# Patient Record
Sex: FEMALE | Race: WHITE | NOT HISPANIC OR LATINO | URBAN - METROPOLITAN AREA
[De-identification: names, ages, dates, MRNs, and addresses within clinical notes are randomized per-mention and may not be internally consistent; named-entity substitution may affect disease eponyms.]

---

## 2024-06-18 ENCOUNTER — TELEPHONE (OUTPATIENT)
Dept: GENETICS | Facility: CLINIC | Age: 29
End: 2024-06-18
Payer: COMMERCIAL

## 2024-06-18 DIAGNOSIS — Z84.81 FAMILY HISTORY OF BRCA GENE MUTATION: ICD-10-CM

## 2024-06-18 DIAGNOSIS — Z80.42 FAMILY HISTORY OF PROSTATE CANCER: ICD-10-CM

## 2024-06-18 DIAGNOSIS — Z80.3 FAMILY HISTORY OF BREAST CANCER: ICD-10-CM

## 2024-06-18 DIAGNOSIS — Z80.41 FAMILY HISTORY OF OVARIAN CANCER: ICD-10-CM

## 2024-06-18 DIAGNOSIS — Z80.49 FAMILY HISTORY OF UTERINE CANCER: ICD-10-CM

## 2024-07-11 ENCOUNTER — TELEPHONE (OUTPATIENT)
Dept: GENETICS | Facility: CLINIC | Age: 29
End: 2024-07-11
Payer: COMMERCIAL

## 2024-07-11 NOTE — TELEPHONE ENCOUNTER
I attempted to leave a message for the patient regarding their outstanding genetic testing sample ordered by Heidi Whitmore GC. I was unable to leave a message as the patients' voicemail box was full.

## 2024-07-11 NOTE — TELEPHONE ENCOUNTER
I received a voicemail from the patient and returned their call. The patient was recently out of town and received their genetic testing kit. They intend to have their blood drawn over the next few days. They are also ready to schedule their preconception genetics visit and would be avaliable tomorrow to discuss scheduling.

## 2024-07-22 ENCOUNTER — TELEPHONE (OUTPATIENT)
Dept: OBSTETRICS AND GYNECOLOGY | Facility: CLINIC | Age: 29
End: 2024-07-22
Payer: COMMERCIAL

## 2024-07-22 NOTE — TELEPHONE ENCOUNTER
Patient called and LVM on Friday. I attempted to call her back to schedule (NEW patient) and her Voicemail box is full and cannot accept anymore messages.

## 2024-07-23 NOTE — TELEPHONE ENCOUNTER
I spoke with the patient regarding their upcoming genetics appointment. The patient remains interested in genetic testing but, has not been feeling well. The patient intends to have their blood drawn within one week. Their 7/29 appointment will be cancelled at this time. The our scheduling team will reach out to the patient once their results are received.

## 2024-07-29 ENCOUNTER — APPOINTMENT (OUTPATIENT)
Dept: GENETICS | Facility: HOSPITAL | Age: 29
End: 2024-07-29
Payer: COMMERCIAL

## 2024-09-19 ENCOUNTER — APPOINTMENT (OUTPATIENT)
Dept: LAB | Facility: LAB | Age: 29
End: 2024-09-19
Payer: COMMERCIAL

## 2024-09-19 ENCOUNTER — OFFICE VISIT (OUTPATIENT)
Dept: OBSTETRICS AND GYNECOLOGY | Facility: CLINIC | Age: 29
End: 2024-09-19
Payer: COMMERCIAL

## 2024-09-19 VITALS
HEIGHT: 67 IN | DIASTOLIC BLOOD PRESSURE: 69 MMHG | SYSTOLIC BLOOD PRESSURE: 105 MMHG | BODY MASS INDEX: 33.84 KG/M2 | WEIGHT: 215.6 LBS

## 2024-09-19 DIAGNOSIS — Z11.51 ENCOUNTER FOR SCREENING FOR HUMAN PAPILLOMAVIRUS (HPV): ICD-10-CM

## 2024-09-19 DIAGNOSIS — Z12.4 CERVICAL CANCER SCREENING: ICD-10-CM

## 2024-09-19 DIAGNOSIS — Z13.79 ENCOUNTER FOR OTHER SCREENING FOR GENETIC AND CHROMOSOMAL ANOMALIES: ICD-10-CM

## 2024-09-19 DIAGNOSIS — Z01.419 WELL WOMAN EXAM: Primary | ICD-10-CM

## 2024-09-19 DIAGNOSIS — Z31.69 ENCOUNTER FOR PRECONCEPTION CONSULTATION: ICD-10-CM

## 2024-09-19 DIAGNOSIS — Z30.09 BIRTH CONTROL COUNSELING: ICD-10-CM

## 2024-09-19 PROCEDURE — 99385 PREV VISIT NEW AGE 18-39: CPT | Performed by: OBSTETRICS & GYNECOLOGY

## 2024-09-19 RX ORDER — EPINEPHRINE 0.3 MG/.3ML
INJECTION INTRAMUSCULAR
COMMUNITY
Start: 2020-08-13

## 2024-09-19 RX ORDER — ERGOCALCIFEROL 1.25 MG/1
1 CAPSULE ORAL
COMMUNITY
Start: 2022-12-12

## 2024-09-19 RX ORDER — BENZOYL PEROXIDE 50 MG/ML
LIQUID TOPICAL NIGHTLY
COMMUNITY
Start: 2024-07-03

## 2024-09-19 RX ORDER — MULTIVIT-MIN/IRON FUM/FOLIC AC 7.5 MG-4
1 TABLET ORAL DAILY
COMMUNITY

## 2024-09-19 ASSESSMENT — ENCOUNTER SYMPTOMS
DEPRESSION: 0
LOSS OF SENSATION IN FEET: 0
OCCASIONAL FEELINGS OF UNSTEADINESS: 0

## 2024-09-19 ASSESSMENT — LIFESTYLE VARIABLES
HOW OFTEN DO YOU HAVE A DRINK CONTAINING ALCOHOL: 2-4 TIMES A MONTH
AUDIT-C TOTAL SCORE: 2
SKIP TO QUESTIONS 9-10: 1
HOW OFTEN DO YOU HAVE SIX OR MORE DRINKS ON ONE OCCASION: NEVER
HOW MANY STANDARD DRINKS CONTAINING ALCOHOL DO YOU HAVE ON A TYPICAL DAY: 1 OR 2

## 2024-09-19 ASSESSMENT — PATIENT HEALTH QUESTIONNAIRE - PHQ9
1. LITTLE INTEREST OR PLEASURE IN DOING THINGS: NOT AT ALL
2. FEELING DOWN, DEPRESSED OR HOPELESS: NOT AT ALL
SUM OF ALL RESPONSES TO PHQ9 QUESTIONS 1 & 2: 0

## 2024-09-19 ASSESSMENT — PAIN SCALES - GENERAL: PAINLEVEL: 0-NO PAIN

## 2024-09-19 NOTE — PROGRESS NOTES
ESTABLISHED ANNUAL GYN VISIT     Patient Name:  Alondra Joiner  :  1995  MR #:  [id number]  Acct #:  [id number]      ASSESSMENT/PLAN:   1. Well woman exam  Return to office in 1 year    2. Cervical cancer screening  - THINPREP PAP TEST    3. Encounter for screening for human papillomavirus (HPV)  - THINPREP PAP TEST    4. Birth control counseling  -Declines    5. Encounter for other screening for genetic and chromosomal anomalies    - Myriad Foresight Carrier Screen; Future  - Myriad Foresight Carrier Screen    6. Encounter for preconception consultation    - Myriad Foresight Carrier Screen; Future  - Myriad Foresight Carrier Screen   -She is already engaging in weight loss efforts and healthy habits.  She was provided literature about the [facility]elt [employer] weight loss program here at [facility].    Counseling:  Medication education:  Education:  All new and/or current medications discussed and reviewed including side effects with patient/caregiver, Understanding:  Caregiver/Patient expressed understanding., Adherence:  Barriers to adherence identified and discussed if present,     OB/GYN Preventive:  Pap smear indicated every 5 years if normal and otherwise low risk. Self breast exam monthly and clinical breast examination yearly discussed.  Diet/Weight management discussed.  Screening colonoscopy recommended starting age 45, then Q3-10 years depending on testing and family history.  Osteoporosis prevention discussion included vit d3/calcium supplements, weight-bearing exercise.  Genitourinary skin hygiene discussed.  Reviewed Gardasil history or recommendation.    Patient has family history of cancer, distant relatives, but has completed genetic counseling    Chief Complaint:  Annual exam    HPI:  Alondra Joiner is a 29 y.o.  Patient's last menstrual period was 2024 (exact date). C. female who presents for annual.  Trying to lose weight. Working with [employer] [facility]. 40 lbs  desired loss over 2-3 years.   Not trying for pregnancy at this time.  Using the rhythm method.  Are open to incidental pregnancy.  She would like to know what she can do in preparation for future consideration  Very distant family history of cancers.  A second cousin on her father side with BRCA gene.  Great great maternal grandmother with uterine cancer.  Patient completed genetic counseling and has a kit pending for draw.    GYNH:   MENARCHE: 10.  31-day cycles since menarche.  HPV Vaccine No. - Declined.  She and , only partners.  Discussed her candidacy.  Sexual activity Yes - . Coitarche Yes - .  Birth control history: Calendar/rhythm method    Past Medical History:   Diagnosis Date    Neck pain        Past Surgical History:   Procedure Laterality Date    WISDOM TOOTH EXTRACTION         Social History     Tobacco Use    Smoking status: Former     Types: Cigarettes    Smokeless tobacco: Never   Vaping Use    Vaping status: Never Used   Substance Use Topics    Drug use: Never        Family History   Problem Relation Name Age of Onset    Hypertension Mother      Hypertension Maternal Grandfather      Other (blood cancer) Paternal Grandmother      Diabetes Paternal Grandfather      Uterine cancer Maternal Great-Grandmother         OB History          0    Para   0    Term   0       0    AB   0    Living   0         SAB   0    IAB   0    Ectopic   0    Multiple   0    Live Births   0                  Prior to Admission medications    Medication Sig Start Date End Date Taking? Authorizing Provider   benzoyl peroxide 5 % external wash Apply topically once daily at bedtime. 7/3/24  Yes Historical Provider, MD   EPINEPHrine (EpiPen 2-Wilson) 0.3 mg/0.3 mL injection syringe as directed Injection as directed for 1 days 20  Yes Historical Provider, MD   ergocalciferol (Vitamin D-2) 1.25 MG (91038 UT) capsule 1 capsule (1,250 mcg). 22  Yes Historical Provider, MD   multivitamin with  "minerals tablet Take 1 tablet by mouth once daily.   Yes Historical Provider, MD   DERRICK'S WORT ORAL Take by mouth.   Yes Historical Provider, MD       Allergies   Allergen Reactions    Bee Venom Protein (Honey Bee) Hives    Levonorgestrel-Ethinyl Estrad Unknown         ROS:   [facility] - WOMEN ONLY:          Breast Lump No acute changes.  Hot Flashes Occasional.  Painful Concepcion no.  Vaginal Discharge no.       [facility] - ROS Update:          Unexplained Weight Change no.  Pain anywhere in your body no.  Black or bloody stools no.  Problems with urination no.  Rashes or sores no.  Sexual problems no.  Depression or anxiety problems no.  Do you feel threatened by anyone No.      OBJECTIVE:   /69   Ht 1.702 m (5' 7\")   Wt 97.8 kg (215 lb 9.6 oz)   LMP 09/05/2024 (Exact Date)   BMI 33.77 kg/m²   Body mass index is 33.77 kg/m².     Physical Exam  GENERAL:   General Appearance:  well-developed, well-nourished, no functional handicap, well-groomed.  Hygiene:  good.  Ill-appearance:  none.  Mental Status:  alert and oriented.  Speech:  clear.  Eye contact:  normal.  Appears stated age:  yes.    LUNGS:  CTAB.  Effort:  no respiratory distress.    HEART:  HRRR with S1/S2 w/o M/C/R  BREASTS: General: Most likely fibrocystic changes, left more than right, non tender, no skin changes, no nipple abnormality, and no axillary lymphadenopathy.   ABDOMEN: Tenderness:  none.  Distention:  none.   GENITOURINARY - FEMALE: Bladder:  normal.  Pelvic support defects:  not seen .  External genitalia:  Normal.  Urethra:  Normal.  Vagina:  no lesions, moderate discharge; STI screens collected - No.  Cervix/ cuff:  normal appearance .  Uterus:  normal size/shape/consistency, non tender.  Adnexa:  normal , non tender.   DERMATOLOGY:  Skin:  normal.   EXTREMITIES: Normal:  no anomalies.  Edema:  none.    NEUROLOGICAL: Orientation:  alert and oriented x 3.   PSYCHOLOGY: Affect:  appropriate.  Mood:  pleasant.    Labs reviewed:  Yes - "     Imaging reviewed:  Yes -     Note: This dictation was generated using Dragon voice recognition software. Please excuse any grammatical or spelling errors that may have occurred using the system.

## 2024-10-03 LAB — COMMENTS - MP RESULT TYPE: NORMAL

## 2024-10-04 LAB
CYTOLOGY CMNT CVX/VAG CYTO-IMP: NORMAL
HPV HR 12 DNA GENITAL QL NAA+PROBE: NEGATIVE
HPV HR GENOTYPES PNL CVX NAA+PROBE: NEGATIVE
HPV16 DNA SPEC QL NAA+PROBE: NEGATIVE
HPV18 DNA SPEC QL NAA+PROBE: NEGATIVE
LAB AP HPV GENOTYPE QUESTION: YES
LAB AP HPV HR: NORMAL
LABORATORY COMMENT REPORT: NORMAL
PATH REPORT.TOTAL CANCER: NORMAL

## 2025-01-13 ENCOUNTER — TELEPHONE (OUTPATIENT)
Dept: GENETICS | Facility: CLINIC | Age: 30
End: 2025-01-13
Payer: COMMERCIAL

## 2025-01-13 DIAGNOSIS — Z80.41 FAMILY HISTORY OF OVARIAN CANCER: ICD-10-CM

## 2025-01-13 DIAGNOSIS — Z80.3 FAMILY HISTORY OF BREAST CANCER: ICD-10-CM

## 2025-01-13 DIAGNOSIS — Z80.42 FAMILY HISTORY OF PROSTATE CANCER: ICD-10-CM

## 2025-01-13 DIAGNOSIS — Z84.81 FAMILY HISTORY OF BRCA GENE MUTATION: ICD-10-CM

## 2025-01-13 DIAGNOSIS — Z80.49 FAMILY HISTORY OF UTERINE CANCER: ICD-10-CM

## 2025-01-13 NOTE — TELEPHONE ENCOUNTER
Patient called in reference to genetic testing to look for an inherited predisposition to cancer. She is planning to proceed with this testing.  She checked the expiration date on the test kit that was sent to her previously, and states it does not  until later this year.  Given this, it is okay for her to use this kit.  We reviewed her family history; there have not been any significant updates to her family history of cancer. She is still unsure whether her paternal cousin with breast cancer has had genetic testing (she has limited contact with this cousin).      She is still interested in pursuing genetic testing for the known familial BRCA1 mutation, [id number] ([id number]), in the context of a multi-gene panel test.   We reviewed panel options, including the updated version of the BRCANext +RNAinsight and the CancerNext +RNAinsight panels. Benefits, limitations, and risks were reviewed. She would still like to proceed with the BRCANext +RNAinsight panel.  There have been slight changes to this panel since our last call, and this panel now analyzes 19 genes associated with hereditary breast and gynecologic cancer (CHOLO, BARD1, BRCA1, BRCA2, BRIP1, CDH1, CHEK2, EPCAM, MLH1, MSH2, MSH6, NF1, PALB2, PMS2, PTEN, RAD51C, RAD51D, STK11, TP53).       She plans to have her blood drawn for testing at a  outpatient blood draw lab in the near future (in the next week), using the [facility] DNA/RNA kit that was sent to her previously. The sample will then be sent to [facility] for analysis. Results are expected in about 3-4 weeks after her blood is drawn for testing.       Ms. Joiner will return for a follow-up virtual visit to review her genetic test results.  A follow up visit will be scheduled on 25 at 2pm (virtual).     Of note, she states that she recently had carrier screening (ordered by her Ob/Gyn), which was negative.     I remain available to Ms. Joiner at [phone] if any  questions arise.     Eloise Whitmore MGC, [facility] [place]  Licensed Genetic Counselor  [place] [facility]  Ph: [phone]

## 2025-01-24 ENCOUNTER — LAB (OUTPATIENT)
Dept: LAB | Facility: LAB | Age: 30
End: 2025-01-24
Payer: COMMERCIAL

## 2025-01-24 DIAGNOSIS — Z80.3 FAMILY HISTORY OF BREAST CANCER: ICD-10-CM

## 2025-01-24 DIAGNOSIS — Z84.81 FAMILY HISTORY OF BRCA GENE MUTATION: ICD-10-CM

## 2025-01-24 DIAGNOSIS — Z80.49 FAMILY HISTORY OF UTERINE CANCER: ICD-10-CM

## 2025-01-24 DIAGNOSIS — Z80.41 FAMILY HISTORY OF OVARIAN CANCER: ICD-10-CM

## 2025-01-24 DIAGNOSIS — Z80.42 FAMILY HISTORY OF PROSTATE CANCER: ICD-10-CM

## 2025-02-17 ENCOUNTER — APPOINTMENT (OUTPATIENT)
Dept: GENETICS | Facility: HOSPITAL | Age: 30
End: 2025-02-17
Payer: COMMERCIAL

## 2025-02-24 ENCOUNTER — TELEMEDICINE CLINICAL SUPPORT (OUTPATIENT)
Dept: GENETICS | Facility: HOSPITAL | Age: 30
End: 2025-02-24
Payer: COMMERCIAL

## 2025-02-24 DIAGNOSIS — Z80.41 FAMILY HISTORY OF OVARIAN CANCER: ICD-10-CM

## 2025-02-24 DIAGNOSIS — Z80.0 FAMILY HISTORY OF THROAT CANCER: ICD-10-CM

## 2025-02-24 DIAGNOSIS — Z80.42 FAMILY HISTORY OF PROSTATE CANCER: ICD-10-CM

## 2025-02-24 DIAGNOSIS — Z71.83 ENCOUNTER FOR NONPROCREATIVE GENETIC COUNSELING: ICD-10-CM

## 2025-02-24 DIAGNOSIS — Z80.49 FAMILY HISTORY OF UTERINE CANCER: ICD-10-CM

## 2025-02-24 DIAGNOSIS — Z80.3 FAMILY HISTORY OF BREAST CANCER: ICD-10-CM

## 2025-02-24 DIAGNOSIS — Z13.71 BRCA NEGATIVE: ICD-10-CM

## 2025-02-24 DIAGNOSIS — Z84.81 FAMILY HISTORY OF BRCA GENE MUTATION: ICD-10-CM

## 2025-02-24 PROCEDURE — 96041 GENETIC COUNSELING SVC EA 30: CPT | Performed by: GENETIC COUNSELOR, MS

## 2025-02-24 NOTE — PROGRESS NOTES
A telephone (audio only) visit between the patient (at the originating site) and provider (at the distant site) was utilized to provide this telehealth service. Verbal consent was requested and obtained from Alondra Joiner on this date, 2025 for a telehealth visit.  Patient confirmed they were located in the [facility] at the time of the appointment.     Genetic counseling follow-up visit note:  Ms. Joiner is a 29 y.o. female who presented today for a virtual follow up visit in the Cancer Genetics Clinic.     She has a paternal family history of breast, ovarian, and throat cancer, and family history of a BRCA1 mutation, c.[id number] ([id number]), which was identified in a paternal first cousin once-removed and paternal second cousin (on her paternal grandfather's side). Ms. Joiner also has a maternal family history of uterine, prostate, and breast cancer.  Based on this family history, she was initially seen in the Cancer Genetics Clinic via virtual visit on 2022.      She ultimately decided to proceed with genetic testing in 2025. A blood sample was sent for genetic testing through [facility], to assess for the known familial BRCA1 mutation in the context of the BRCANext +Serious Parody panel, which analyzed 19 genes associated with hereditary breast and gynecologic cancer.  The results of this testing have returned and are NEGATIVE.   Ms. Joiner was contacted today via telephone to discuss her results, and our discussion is summarized below. She has access to the results via [employer].     Family history:   A 3-generation pedigree was previously obtained and was significant for the following:  - Father with non-melanoma skin cancer X 3, located on his arms. Has not had genetic testing.   - Paternal uncles X 4 (one  at age 18, the other three are in their 50s-60s), no cancer history. They have not had genetic testing.   - Paternal 1st cousin, breast cancer diagnosed in her 40s,  unknown if she has had genetic testing (limited info/contact).  - Paternal 1st cousin once-removed (PGF's brother's daughter), ovarian cancer diagnosed at age 50. Had genetic testing in Dec 2015 through [facility] via the Breast/Ovarian Cancer Panel, which analyzed the following genes: CHOLO, BARD1, BRCA1, BRCA2, BRIP1, CDH1, CHEK2, EPCAM, FANCC, MLH1, MSH2, MSH6, NBN, PALB2, PMS2, PTEN, RAD51C, RAD51D, STIK11, TP53, XRCC2. The results were POSITIVE for a pathogenic mutation in the BRCA1 gene called c.[id number] ([id number]).   - Paternal 2nd cousin, breast cancer diagnosed at age 32, and reportedly tested positive for the familial BRCA1 mutation (report not provided for review). This is the daughter of the paternal 1st cousin once-removed who is also BRCA1+.   - Paternal great aunt (DASHAWN's sister), diagnosed with throat cancer, +smoking hx,  in her 40s.   - Paternal great uncle (DASHAWN's brother), diagnosed with throat cancer, +smoking hx,  in his 50s.   - Paternal grandmother, T cell lymphoma diagnosed at age 84,  at age 85.  - Paternal great grandmother (PGM's mother), throat cancer diagnosed in her 40s,  at age 99.   - Maternal grandmother with uterine cancer diagnosed in her 50s  - Maternal half uncle with prostate cancer diagnosed at 67  - Maternal great grandmother diagnosed with breast cancer and cervical cancer in her early 50s      Maternal ancestry is mixed  (Yakut, Belizean, Greenlandic, etc.). Paternal ancestry is [place] and English. There is no known Ashkenazi Judaism ancestry. Consanguinity was denied.     Genetic test results:  Please refer to the genetic test report from [employer] scanned under Media, attached to this encounter. Results are summarized here.    Test performed:  BRCANext +RNAinsight    Results: NEGATIVE.     No pathogenic mutations, variants of unknown significance, or gross deletions or duplications were detected.    Genes Analyzed (19 total): CHOLO, BARD1,  "BRCA1, BRCA2, BRIP1, CDH1, CHEK2, MLH1, MSH2, MSH6, NF1, PALB2, PMS2, PTEN, RAD51C, RAD51D, STK11 and TP53 (sequencing and deletion/duplication); EPCAM (deletion/duplication only). RNA data is routinely analyzed for use in variant interpretation for all genes.    Comment(s): The BRCA1 \"c.[id number] (p.[id number])\" variant was not detected in this individual's specimen.    Discussion:  Ms. Joiner's genetic test results were NEGATIVE. The familial mutation in BRCA1 was NOT detected. This is considered a \"true negative\" result. Additionally, no other mutations were identified in the other tested genes.     Ms. Joiner's true negative genetic test results are reassuring with regard to her lifetime risk for breast cancer. Since she did not inherit the familial BRCA1 mutation, Ms. Lotts risk for breast cancer is expected to be similar to the general population risk. She should follow the recommendations of her primary care physician regarding breast cancer screening, including annual mammograms starting at age 40, annual clinical encounters, and practicing breast awareness.     It was previously discussed that women with a BRCA1 mutation have an increased risk for ovarian cancer. With negative genetic test results, Ms. Joiner is not considered to have an increased risk for ovarian cancer from a genetic standpoint. Prophylactic surgery is not indicated from a genetic standpoint.    Ms. Joiner should follow the recommendations of her PCP with regard to other age-appropriate cancer screenings, such as regular PAP tests, colonoscopies beginning at age 45, etc. (in addition to breast cancer screening as noted above).     Since Ms. Joiner did not inherit the familial BRCA1 mutation, any future children of hers would not need to be tested for this mutation.     Other relatives still remain at risk to have inherited this mutation. This includes her brother, father, and other paternal relatives. We " encourage these relatives (who are at least 18 years old) to pursue their own genetic counseling and testing. If any at-risk relatives are local, we would be happy to meet with them in our Cancer Genetics Clinic at .  They are welcome to call me directly ([phone]).  They may also locate a genetic counselor in their area at [email or web address].     If other relatives pursue genetic testing, we encourage Ms. Joiner to keep us updated, as their results may impact our understanding of Ms. Joiner's cancer risk.     The information we discussed and included in this note is what is known as of this date. Our approach to Ms. Joiner's family may change as more information becomes available. We encourage Ms. Joiner to contact us at [phone] with any updates, questions, or concerns.      Eloise Whitmore, MGC, [facility] [place]  Licensed Genetic Counselor  [place] [facility]  [phone] (direct)  [phone], opt 1 (main office)      Total time spent on day of encounter: 22 minutes (10 minutes with patient, 12 minutes on pre/post patient care activities, including documentation).

## 2025-04-01 ENCOUNTER — APPOINTMENT (OUTPATIENT)
Dept: AUDIOLOGY | Facility: CLINIC | Age: 30
End: 2025-04-01
Payer: COMMERCIAL

## 2025-04-01 ENCOUNTER — APPOINTMENT (OUTPATIENT)
Dept: OTOLARYNGOLOGY | Facility: CLINIC | Age: 30
End: 2025-04-01
Payer: COMMERCIAL

## 2025-04-01 VITALS — WEIGHT: 206 LBS | BODY MASS INDEX: 32.33 KG/M2 | HEIGHT: 67 IN

## 2025-04-01 DIAGNOSIS — H92.01 RIGHT EAR PAIN: Primary | ICD-10-CM

## 2025-04-01 DIAGNOSIS — Z01.10 ENCOUNTER FOR HEARING EXAMINATION WITHOUT ABNORMAL FINDINGS: Primary | ICD-10-CM

## 2025-04-01 PROCEDURE — 3008F BODY MASS INDEX DOCD: CPT

## 2025-04-01 PROCEDURE — 92550 TYMPANOMETRY & REFLEX THRESH: CPT | Performed by: AUDIOLOGIST

## 2025-04-01 PROCEDURE — 99202 OFFICE O/P NEW SF 15 MIN: CPT

## 2025-04-01 PROCEDURE — 92557 COMPREHENSIVE HEARING TEST: CPT | Performed by: AUDIOLOGIST

## 2025-04-01 ASSESSMENT — ENCOUNTER SYMPTOMS
HEADACHES: 1
NECK PAIN: 1

## 2025-04-01 NOTE — PROGRESS NOTES
AUDIOLOGY  AUDIOMETRIC EVALUATION    Name:  Alondra Joiner  :  1995  Age:  30 y.o.  Date of Evaluation:  2025    Reason for visit: Ms. Joiner is seen in the clinic today at the request of DARWIN Pickard in otolaryngology for an audiologic evaluation. Patient complains of otalgia. No prior audiologic evaluation is available for comparison.     AUDIOLOGIC EVALUATION  See scanned audiogram: “Media” > “Audiology Report” > Document ID [id number].    Objective   Otoscopic Evaluation  Right Ear: minimal non-occluding cerumen with visualization of the tympanic membrane  Left Ear: minimal non-occluding cerumen with visualization of the tympanic membrane    Immittance Measures  Tympanometry:  Right Ear: Type A, normal tympanic membrane mobility with normal middle ear pressure  Left Ear: Type A, normal tympanic membrane mobility with normal middle ear pressure    Acoustic Reflexes:  Ipsilateral Right Ear: present and normal from 500 Hz to 4000 Hz  Ipsilateral Left Ear: present and normal from 500 Hz to 4000 Hz  Contralateral Right Ear: did not evaluate  Contralateral Left Ear: did not evaluate    Distortion Product Otoacoustic Emissions (DPOAEs)  Right Ear: present from 1000 Hz to 8000 Hz  Left Ear: present from 1000 Hz to 8000 Hz    Audiometry  Test Technique and Reliability:   Standard audiometry via supra-aural headphones. Pulsed tone stimulus. Reliability is good.    Pure tone air and bone conduction audiometry:  Right Ear: normal hearing sensitivity  Left Ear: normal hearing sensitivity    Speech Audiometry:  Speech Reception Threshold (SRT) Right Ear: 10 dB HL  Speech Reception Threshold (SRT) Left Ear: 10 dB HL  Word Recognition Score (WRS) Right Ear: Excellent, 100% at 50 dB HL  Word Recognition Score (WRS) Left Ear: Excellent, 100% at 50 dB HL    IMPRESSIONS  Audiometric evaluation revealed normal hearing sensitivity bilaterally. Tympanometry indicates normal tympanic membrane mobility  with normal middle ear pressure (Type A) bilaterally. The presence of acoustic reflexes within normal intensity limits is consistent with normal middle ear and brainstem function, and suggests that auditory sensitivity is not significantly impaired. Present Distortion Product Otoacoustic Emissions (DPOAEs) suggest normal/near normal cochlear outer hair cell function and are consistent with no greater than a mild hearing loss at those frequencies. No prior audiologic evaluation is available for comparison.     RECOMMENDATIONS  - Follow up with otolaryngology today as scheduled.  - Audiologic evaluation as needed.  - Follow-up with medical care team as planned.    PATIENT EDUCATION  Discussed results, impressions and recommendations with the patient. Questions were addressed and the patient was encouraged to contact our office should concerns arise.    Time for this encounter: 9903-5282    Abbey Phelps, CCC-A  Licensed Audiologist

## 2025-04-01 NOTE — PROGRESS NOTES
"Chief Complaint   Patient presents with    New Patient Visit     NP- AUDIO FOLLOW UP, EAR PAIN      HPI:  Alondra Joiner is a 30 y.o. female presents for complaints of ear pain on the right, this has been on and off for couple months.  She denies tinnitus or otorrhea.  No change in hearing.  She does report jaw and neck pain and carries tension in her shoulders.  Also reports noting increased ear discomfort after exercising.  We reviewed her normal audiogram today        PMH:  Past Medical History:   Diagnosis Date    Neck pain      Past Surgical History:   Procedure Laterality Date    WISDOM TOOTH EXTRACTION  2021         Medications:     Current Outpatient Medications:     benzoyl peroxide 5 % external wash, Apply topically once daily at bedtime., Disp: , Rfl:     EPINEPHrine (EpiPen 2-Wilson) 0.3 mg/0.3 mL injection syringe, as directed Injection as directed for 1 days, Disp: , Rfl:     ergocalciferol (Vitamin D-2) 1.25 MG (47681 UT) capsule, 1 capsule (1,250 mcg)., Disp: , Rfl:     multivitamin with minerals tablet, Take 1 tablet by mouth once daily., Disp: , Rfl:     DERRICK'S WORT ORAL, Take by mouth., Disp: , Rfl:      Allergies:  Allergies   Allergen Reactions    Bee Venom Protein (Honey Bee) Hives    Levonorgestrel-Ethinyl Estrad Unknown        ROS:  Review of systems normal unless stated otherwise in the HPI and/or PMH.    Physical Exam:  Height 1.702 m (5' 7\"), weight 93.4 kg (206 lb). Body mass index is 32.26 kg/m².     GENERAL APPEARANCE: Well developed and well nourished.  Alert and oriented in no acute distress.  Normal vocal quality.      HEAD/FACE: No erythema or edema or facial tenderness.  Normal facial nerve function bilaterally.    EAR:       EXTERNAL: Normal pinnas and external auditory canals without lesion or obstructing wax.       MIDDLE EAR: Tympanic membranes intact and mobile with normal landmarks.  Middle ear space appears well aerated.       TUBE STATUS: N/A       MASTOID CAVITY: N/A     "   HEARING: Gross hearing assessment is within normal limits.      NOSE:       VISUALIZED USING: Anterior rhinoscopy with headlight and nasal speculum.       DORSUM: Midline, nontraumatic appearance.       MUCOSA: Normal-appearing.       SECRETIONS: Normal.       SEPTUM: Midline and nonobstructing.       INFERIOR TURBINATES: Normal.       MIDDLE TURBINATES/MEATUS: N/A       BLEEDING: N/A         ORAL CAVITY/PHARYNX:       TEETH: Adequate dentition.       TONGUE: No mass or lesion.  Normal mobility.       FLOOR OF MOUTH: No mass or lesion.       PALATE: Normal hard palate, soft palate, and uvula.       OROPHARYNX: Normal without mass or lesion.       BUCCAL MUCOSA/GBS: Normal without mass or lesion.       LIPS: Normal.    LARYNX/HYPOPHARYNX/NASOPHARYNX: N/A    NECK: No palpable masses or abnormal adenopathy.  Trachea is midline.    THYROID: No thyromegaly or palpable nodule.    SALIVARY GLANDS: Normal bilateral parotid and submandibular glands by inspection and palpation.    TMJ's: Normal.    NEURO: Cranial nerve exam grossly normal bilaterally.       Assessment/Plan   There are no diagnoses linked to this encounter.   I provided reassurances for Alondra normal ear exam today.  We discussed referred pain from jaw and neck.  We discussed comfort measures and stress relief like meditation and journaling.  No follow-ups on file.     Hina Phipps, APRN-CNP

## 2025-06-09 LAB
COMMENTS - MP RESULT TYPE: NORMAL
SCAN RESULT: NORMAL